# Patient Record
Sex: MALE | Race: WHITE | Employment: FULL TIME | URBAN - METROPOLITAN AREA
[De-identification: names, ages, dates, MRNs, and addresses within clinical notes are randomized per-mention and may not be internally consistent; named-entity substitution may affect disease eponyms.]

---

## 2019-01-17 PROBLEM — E78.5 HYPERLIPIDEMIA: Status: ACTIVE | Noted: 2019-01-17

## 2019-01-17 PROBLEM — J30.2 SEASONAL ALLERGIES: Status: ACTIVE | Noted: 2019-01-17

## 2019-01-17 PROBLEM — G47.33 OBSTRUCTIVE SLEEP APNEA SYNDROME: Status: ACTIVE | Noted: 2019-01-17

## 2022-09-09 ENCOUNTER — HOSPITAL ENCOUNTER (OUTPATIENT)
Dept: PREADMISSION TESTING | Age: 62
Discharge: HOME | End: 2022-09-13

## 2022-09-09 VITALS — HEIGHT: 71 IN | BODY MASS INDEX: 31.36 KG/M2 | WEIGHT: 224 LBS

## 2022-09-09 NOTE — PROGRESS NOTES

## 2022-09-22 ENCOUNTER — ANESTHESIA EVENT (OUTPATIENT)
Dept: ENDOSCOPY | Age: 62
End: 2022-09-22
Payer: COMMERCIAL

## 2022-09-23 ENCOUNTER — ANESTHESIA (OUTPATIENT)
Dept: ENDOSCOPY | Age: 62
End: 2022-09-23
Payer: COMMERCIAL

## 2022-09-23 ENCOUNTER — HOSPITAL ENCOUNTER (OUTPATIENT)
Age: 62
Setting detail: OUTPATIENT SURGERY
Discharge: HOME | End: 2022-09-23
Attending: SURGERY | Admitting: SURGERY
Payer: COMMERCIAL

## 2022-09-23 VITALS
OXYGEN SATURATION: 96 % | DIASTOLIC BLOOD PRESSURE: 76 MMHG | TEMPERATURE: 97.2 F | SYSTOLIC BLOOD PRESSURE: 151 MMHG | BODY MASS INDEX: 30.8 KG/M2 | HEART RATE: 60 BPM | WEIGHT: 220 LBS | HEIGHT: 71 IN | RESPIRATION RATE: 20 BRPM

## 2022-09-23 PROCEDURE — 7100000030 HC ASPR PHASE II RECOVERY - FIRST 15 MIN: Performed by: SURGERY

## 2022-09-23 PROCEDURE — 3609027000 HC COLONOSCOPY: Performed by: SURGERY

## 2022-09-23 PROCEDURE — 6360000002 HC RX W HCPCS: Performed by: NURSE ANESTHETIST, CERTIFIED REGISTERED

## 2022-09-23 PROCEDURE — 3700000001 HC ADD 15 MINUTES (ANESTHESIA): Performed by: SURGERY

## 2022-09-23 PROCEDURE — 7100000000 HC PACU RECOVERY - FIRST 15 MIN: Performed by: SURGERY

## 2022-09-23 PROCEDURE — 2709999900 HC NON-CHARGEABLE SUPPLY: Performed by: SURGERY

## 2022-09-23 PROCEDURE — 7100000011 HC PHASE II RECOVERY - ADDTL 15 MIN: Performed by: SURGERY

## 2022-09-23 PROCEDURE — 7100000010 HC PHASE II RECOVERY - FIRST 15 MIN: Performed by: SURGERY

## 2022-09-23 PROCEDURE — 2500000003 HC RX 250 WO HCPCS: Performed by: NURSE ANESTHETIST, CERTIFIED REGISTERED

## 2022-09-23 PROCEDURE — 7100000001 HC PACU RECOVERY - ADDTL 15 MIN: Performed by: SURGERY

## 2022-09-23 PROCEDURE — 7100000031 HC ASPR PHASE II RECOVERY - ADDTL 15 MIN: Performed by: SURGERY

## 2022-09-23 PROCEDURE — 3700000000 HC ANESTHESIA ATTENDED CARE: Performed by: SURGERY

## 2022-09-23 PROCEDURE — 2580000003 HC RX 258: Performed by: ANESTHESIOLOGY

## 2022-09-23 RX ORDER — SODIUM CHLORIDE 9 MG/ML
INJECTION, SOLUTION INTRAVENOUS PRN
Status: DISCONTINUED | OUTPATIENT
Start: 2022-09-23 | End: 2022-09-23 | Stop reason: HOSPADM

## 2022-09-23 RX ORDER — LIDOCAINE HYDROCHLORIDE 10 MG/ML
1 INJECTION, SOLUTION EPIDURAL; INFILTRATION; INTRACAUDAL; PERINEURAL
Status: DISCONTINUED | OUTPATIENT
Start: 2022-09-23 | End: 2022-09-23 | Stop reason: HOSPADM

## 2022-09-23 RX ORDER — LIDOCAINE HYDROCHLORIDE 10 MG/ML
INJECTION, SOLUTION EPIDURAL; INFILTRATION; INTRACAUDAL; PERINEURAL PRN
Status: DISCONTINUED | OUTPATIENT
Start: 2022-09-23 | End: 2022-09-23 | Stop reason: SDUPTHER

## 2022-09-23 RX ORDER — SODIUM CHLORIDE 0.9 % (FLUSH) 0.9 %
5-40 SYRINGE (ML) INJECTION PRN
Status: DISCONTINUED | OUTPATIENT
Start: 2022-09-23 | End: 2022-09-23 | Stop reason: HOSPADM

## 2022-09-23 RX ORDER — ONDANSETRON 2 MG/ML
4 INJECTION INTRAMUSCULAR; INTRAVENOUS
Status: DISCONTINUED | OUTPATIENT
Start: 2022-09-23 | End: 2022-09-23 | Stop reason: HOSPADM

## 2022-09-23 RX ORDER — FENTANYL CITRATE 50 UG/ML
25 INJECTION, SOLUTION INTRAMUSCULAR; INTRAVENOUS EVERY 5 MIN PRN
Status: DISCONTINUED | OUTPATIENT
Start: 2022-09-23 | End: 2022-09-23 | Stop reason: HOSPADM

## 2022-09-23 RX ORDER — SODIUM CHLORIDE 0.9 % (FLUSH) 0.9 %
5-40 SYRINGE (ML) INJECTION EVERY 12 HOURS SCHEDULED
Status: DISCONTINUED | OUTPATIENT
Start: 2022-09-23 | End: 2022-09-23 | Stop reason: HOSPADM

## 2022-09-23 RX ORDER — FENTANYL CITRATE 50 UG/ML
50 INJECTION, SOLUTION INTRAMUSCULAR; INTRAVENOUS EVERY 5 MIN PRN
Status: DISCONTINUED | OUTPATIENT
Start: 2022-09-23 | End: 2022-09-23 | Stop reason: HOSPADM

## 2022-09-23 RX ORDER — SODIUM CHLORIDE, SODIUM LACTATE, POTASSIUM CHLORIDE, CALCIUM CHLORIDE 600; 310; 30; 20 MG/100ML; MG/100ML; MG/100ML; MG/100ML
INJECTION, SOLUTION INTRAVENOUS CONTINUOUS
Status: DISCONTINUED | OUTPATIENT
Start: 2022-09-23 | End: 2022-09-23 | Stop reason: HOSPADM

## 2022-09-23 RX ORDER — PROPOFOL 10 MG/ML
INJECTION, EMULSION INTRAVENOUS PRN
Status: DISCONTINUED | OUTPATIENT
Start: 2022-09-23 | End: 2022-09-23 | Stop reason: SDUPTHER

## 2022-09-23 RX ORDER — DIPHENHYDRAMINE HYDROCHLORIDE 50 MG/ML
12.5 INJECTION INTRAMUSCULAR; INTRAVENOUS
Status: DISCONTINUED | OUTPATIENT
Start: 2022-09-23 | End: 2022-09-23 | Stop reason: HOSPADM

## 2022-09-23 RX ORDER — FENTANYL CITRATE 50 UG/ML
INJECTION, SOLUTION INTRAMUSCULAR; INTRAVENOUS PRN
Status: DISCONTINUED | OUTPATIENT
Start: 2022-09-23 | End: 2022-09-23 | Stop reason: SDUPTHER

## 2022-09-23 RX ADMIN — LIDOCAINE HYDROCHLORIDE 50 MG: 10 INJECTION, SOLUTION EPIDURAL; INFILTRATION; INTRACAUDAL; PERINEURAL at 10:13

## 2022-09-23 RX ADMIN — FENTANYL CITRATE 50 MCG: 50 INJECTION, SOLUTION INTRAMUSCULAR; INTRAVENOUS at 10:18

## 2022-09-23 RX ADMIN — PROPOFOL 50 MG: 10 INJECTION, EMULSION INTRAVENOUS at 10:14

## 2022-09-23 RX ADMIN — PROPOFOL 200 MG: 10 INJECTION, EMULSION INTRAVENOUS at 10:13

## 2022-09-23 RX ADMIN — PROPOFOL 50 MG: 10 INJECTION, EMULSION INTRAVENOUS at 10:15

## 2022-09-23 RX ADMIN — SODIUM CHLORIDE, POTASSIUM CHLORIDE, SODIUM LACTATE AND CALCIUM CHLORIDE: 600; 310; 30; 20 INJECTION, SOLUTION INTRAVENOUS at 09:21

## 2022-09-23 ASSESSMENT — ENCOUNTER SYMPTOMS
WHEEZING: 0
COUGH: 0
SORE THROAT: 0
TROUBLE SWALLOWING: 0
RHINORRHEA: 0
SHORTNESS OF BREATH: 0

## 2022-09-23 ASSESSMENT — PAIN SCALES - GENERAL: PAINLEVEL_OUTOF10: 0

## 2022-09-23 ASSESSMENT — PAIN - FUNCTIONAL ASSESSMENT: PAIN_FUNCTIONAL_ASSESSMENT: 0-10

## 2022-09-23 NOTE — ANESTHESIA POSTPROCEDURE EVALUATION
Department of Anesthesiology  Postprocedure Note    Patient: Jordana Luciano  MRN: [id number]  YOB: 1960  Date of evaluation: 9/23/2022      Procedure Summary     Date: 09/23/22 Room / Location: [facility] ENDO 03 / Sirisha Pham SHANDA    Anesthesia Start: 1008 Anesthesia Stop: 3347    Procedure: COLORECTAL CANCER SCREENING, NOT HIGH RISK (Rectum) Diagnosis:       Colon cancer screening      (COLON CANCER SCREENING)    Surgeons: Maren Samayoa MD Responsible Provider: Camila Cordova MD    Anesthesia Type: General ASA Status: 3          Anesthesia Type: General    Christiano Phase I: Christiano Score: 10    Christiano Phase II: Christiano Score: 10      Anesthesia Post Evaluation    Comments: POST- ANESTHESIA EVALUATION       Pt Name: Jordana Luciano  MRN: [id number]  YOB: 1960  Date of evaluation: 9/23/2022  Time:  12:42 PM      BP (!) 151/76   Pulse 60   Temp 97.2 °F (36.2 °C) (Infrared)   Resp 20   Ht 5' 11\" (1.803 m)   Wt 220 lb (99.8 kg)   SpO2 96%   BMI 30.68 kg/m²      Consciousness Level  Awake  Cardiopulmonary Status  Stable  Pain Adequately Treated YES  Nausea / Vomiting  NO  Adequate Hydration  YES  Anesthesia Related Complications NONE      Electronically signed by Camila Cordova MD on 9/23/2022 at 12:42 PM

## 2022-09-23 NOTE — OP NOTE
Operative Note      Patient: Elisabeth Portillo  YOB: 1960  MRN: [id number]    Date of Procedure: 9/23/2022  PROCEDURE NOTE    DATE OF PROCEDURE: 9/23/2022    SURGEON: Alexis Barrera MD    ASSISTANT: None    PREOPERATIVE DIAGNOSIS: Screening colonoscopy    POSTOPERATIVE DIAGNOSIS: Grade 1 internal hemorrhoid. Mild sigmoid diverticulosis. OPERATION: Total colonoscopy to cecum with intubation of terminal ileum    ANESTHESIA: General    ESTIMATED BLOOD LOSS: None    COMPLICATIONS: None     SPECIMENS:  Was Not Obtained    HISTORY: The patient is a 65y.o. year old male with history of above preop diagnosis. I recommended colonoscopy with possible biopsy or polypectomy and I explained the risk, benefits, expected outcome, and alternatives to the procedure. Risks included but are not limited to bleeding, infection, respiratory distress, hypotension, and perforation of the colon and possibility of missing a lesion. The patient understands and is in agreement. PROCEDURE: The patient was given IV conscious sedation. The patient's SPO2 remained above 90% throughout the procedure. Digital rectal exam was normal.  The colonoscope was inserted through the anus into the rectum and advanced under direct vision to the cecum without difficulty. Terminal ileum was examined for approximately 2 inches. The prep was good. Findings:  Terminal ileum: normal    Cecum/Ascending colon: normal    Transverse colon: normal    Descending/Sigmoid colon: abnormal: Sigmoid diverticulosis mild    Rectum/Anus: examined in normal and retroflexed positions and was abnormal: Grade 1 internal hemorrhoid    Withdrawal Time was (minutes): 20      Next screening colonoscopy: 10 years. The colon was decompressed. While withdrawing the scope the above findings were verified and the scope was removed. The patient tolerated the procedure and conscious sedation without unusual events.     In the recovery room patient was examined and remains hemodynamically stable. Discharge home when criteria met.     Recommendations/Plan:   Discussed with the family  High fiber diet   Precautions to avoid constipation    Electronically signed by Shorty Fields MD  on 9/23/2022 at 10:48 AM

## 2022-09-23 NOTE — DISCHARGE INSTRUCTIONS
Sedation or General Anesthesia, Adult  Care After  Refer to this sheet in the next 24 hours. These instructions provide you with information on caring for yourself after your procedure. Your caregiver may also give you more specific instructions. Your treatment has been planned according to current medical practices, but problems sometimes occur. Call your caregiver if you have any problems or questions after your procedure. HOME CARE INSTRUCTIONS   Do not participate in any activities that require you to be alert or coordinated. Do not:  Drive. Swim. Ride a bicycle. Operate heavy machinery. Rosalie Mary. Use power tools. Climb ladders. Work at [employer]. Take a bath. Do not drink alcohol. Do not make any important decisions or sign legal documents. Stay with an adult. The first meal following your procedure should be light and small. Avoid solid foods if you feel sick to your stomach (nauseous) or if you throw up (vomit). Drink enough fluids to keep your urine clear or pale yellow. Only take your usual medicines or new medicines if your caregiver approves them. Only take over-the-counter or prescription medicines for pain, discomfort, or fever as directed by your caregiver. Keep all follow-up appointments as directed by your caregiver. SEEK IMMEDIATE MEDICAL CARE IF:   You are not feeling normal or behaving normally after 24 hours. You have persistent nausea and vomiting. You are unable to drink fluids or eat food. You have difficulty urinating. You have difficulty breathing or speaking. You have blue or gray skin. There is difficulty waking or you cannot be woken up. You have heavy bleeding, redness, or a lot of swelling where the sedative or anesthesia entered your skin (intravenous site). You have a rash. MAKE SURE YOU:  Understand these instructions. Will watch your condition. Will get help right away if you are not doing well or get worse.   Document Released: 12/18/2006 Document Revised: 06/18/2013 Document Reviewed: 04/17/2013  ExitCare® Patient Information ©2013 [employer]. Colonoscopy: What to Expect at [facility]  After you have a colonoscopy, you will stay at the clinic for 1 to 2 hours until the medicines wear off. Then you can go home, but you will need to arrange for a ride. Your doctor will tell you when you can eat and do your other usual activities. Your doctor will talk to you about when you will need your next colonoscopy. The results of your test and your risk for colorectal cancer will help your doctor decide how often you need to be checked. After the test, you may be bloated or have gas pains. You may need to pass gas. If a biopsy was done or a polyp was removed, you may have streaks of blood in your stool (feces) for a few days. This care sheet gives you a general idea about how long it will take for you to recover. But each person recovers at a different pace. Follow the steps below to get better as quickly as possible. How can you care for yourself at home? Activity  Rest as much as you need to after you go home. You should be able to go back to your usual activities the day after the test.  Diet  Follow your doctors directions for eating. Drink plenty of fluids (unless your doctor has told you not to) to replace the fluids that were lost during the colon prep. Do not drink alcohol. Medicines  If polyps were removed or a biopsy was done during the test, your doctor may tell you not to take aspirin or other anti-inflammatory medicines, such as ibuprofen (Advil, Motrin) and naproxen (Aleve), for a few days. Other instructions  For your safety, you should not drive or operate machinery until the medicine effects are gone and you can think clearly. Your doctor may tell you not to drive or operate machinery until the day after your test.  Do not sign legal documents or make major decisions until the medicine effects are gone and you can think clearly.  The anesthesia medicine can make it hard for you to fully understand what you are agreeing to. Follow-up care is a key part of your treatment and safety. Be sure to make and go to all appointments, and call your doctor if you are having problems. It's also a good idea to know your test results and keep a list of the medicines you take. Call your Doctor if you have any of the following:             Passing blood rectally or vomiting blood (it may be red or black). Persistent nausea or vomiting. Severe abdominal or chest pain, not relieved by passing gas. Fever of 100 or more, chills or excessive sweating. Redness or swelling at the IV site. If you experience shortness of breath or severe chest pain, call 911. Where can you learn more? Go to https://Xpresso.Burstly. org and sign in to your Mattermark account. Enter E264 in the [employer] box to learn more about Colonoscopy: What to Expect at Home.     If you do not have an account, please click on the Sign Up Now link. © 2385-0071 Healthwise, Incorporated. Care instructions adapted under license by [facility]. This care instruction is for use with your licensed healthcare professional. If you have questions about a medical condition or this instruction, always ask your healthcare professional. [address] any warranty or liability for your use of this information. Content Version: 2.6.254404; Last Revised: February 20, 2013       PATIENT INSTRUCTIONS  DIVERTICULOSIS    FOLLOW-UP:  Please make an appointment with your physician as directed. Call your physician immediately if you have any fevers greater than 102.5,  increasing abdominal pain, GI bleeding (from the colon or rectum),or nausea/vomiting.       CAUSE:  Some people may have congenital diverticulosis, but most people develop diverticulosis around or after age 36 due to a low-fiber, high-fat diet, along with inadequate fluid intake. This is very prevalent in the industrialized world where we eat a lot of processed, low fiber foods. Diverticuli develop due to firm stool and high pressures in the colon, along with secondary spasm, which causes outpouchings to occus where the small arteries penetrate the wall of the colon to feed the internal lining. These occur most commonly on the left side and lower portions of the colon. Diverticuli can then cause bleeding from the arteries at these sites of weakness when they rupture or the diveritculi can get blocked with stool and debris and become obstructed, causing diverticulosis, which is due to an infection. When these are infected, diverticulitis, it is often treated with antibiotics and bowel rest, but when severe, recurrent, or if rupture of the colon occurs, it may require surgery. Following appropriate dietary changes and taking the proper precautions is therefore very important. DIET:  You should increase your dietary fiber intake and take a fiber supplement twice a day. Make sure that you are taking a supplement that is just fiber and is not a laxative, which should be noted on the package. Starting a fiber supplement may cause increased gas, more frequent bowel movements, and distension at first but this should improve after a couple of weeks. Try to eat whole wheat breads and pasta, more fruits and vegetables, along with brown rice and plenty of fluids. Avoid small undigestible food items that could get stuck in these outpouchings, such as unpopped popcorn kernals, whole corn, small undigestible seeds, etc..    ACTIVITY:  Exercise is also a great way to prevent constipation and is encouraged. It may also help prevent progression of your diverticulosis. Always make sure you take in plenty of fluids when exercising. MEDICATIONS:  Take an over-the-counter fiber supplement as noted above twice daily.   If your symptoms don't improve with fiber and dietary changes alone your physician may also recommend psyllium or methylcellulose as well. If your physician has placed you on an antibiotic it is critical that you take the full course of these, even if your symptoms have improved, and that you not miss any doses. QUESTIONS:  Please feel free to call your physician or the hospital  if you have any questions, and they will be glad to assist you. If you have further questions it may also be helpful to meet with a dietitician. High-Fiber Diet     What Is Fiber? Dietary fiber is a form of carbohydrate found in plants that cannot be digested by humans. All plants contain fiber, including fruits, vegetables, grains, and legumes. Fiber is often classified into two categories: soluble and insoluble. Soluble fiber draws water into the bowel and can help slow digestion. Examples of foods that are high in soluble fiber include oatmeal, oat bran, barley, legumes (eg, beans and peas), apples, and strawberries. Insoluble fiber speeds digestion and can add bulk to the stool. Examples of foods that are high in insoluble fiber include whole-wheat products, wheat bran, cauliflower, green beans, and potatoes. Why Follow a High-Fiber Diet? A high-fiber diet is often recommended to prevent and treat constipation , hemorrhoids , diverticulitis , and irritable bowel syndrome . Eating a high-fiber diet can also help improve your cholesterol levels, lower your risk of coronary heart disease , reduce your risk of type 2 diabetes , and lower your weight. For people with type 1 or 2 diabetes, a high-fiber diet can also help stabilize blood sugar levels. How Much Fiber Should I Eat? A high-fiber diet should contain  20-35 grams  of fiber a day. This is actually the amount recommended for the general adult population; however, most Americans eat only 15 grams of fiber per day.    Digestion of Fiber   Eating a higher fiber diet than usual can take some getting used to by your body's digestive system. To avoid the side effects of sudden increases in dietary fiber (eg, gas, cramping, bloating, and diarrhea), increase fiber gradually and be sure to drink plenty of fluids every day. Tips for Increasing Fiber Intake   Whenever possible, choose whole grains over refined grains (eg, brown rice instead of white rice, whole-wheat bread instead of white bread). Include a variety of grains in your diet, such as wheat, rye, barley, oats, quinoa, and bulgur. Eat more vegetarian-based meals. Here are some ideas: black bean burgers, eggplant lasagna, and veggie tofu stir-alamo. Choose high-fiber snacks, such as fruits, popcorn, whole-grain crackers, and nuts. Make whole-grain cereal or whole-grain toast part of your daily breakfast regime. When eating out, whether ordering a sandwich or dinner, ask for extra vegetables. When baking, replace part of the white flour with whole-wheat flour. Whole-wheat flour is particularly easy to incorporate into a recipe. High-Fiber Diet Eating Guide   Food Category   Foods Recommended   Notes   Grains   Whole-grain breads, muffins, bagels, or argelia bread Rye bread Whole-wheat crackers or crisp breads Whole-grain or bran cereals Oatmeal, oat bran, or grits Wheat germ Whole-wheat pasta and brown rice   Read the ingredients list on food labels. Look for products that list \"whole\" as the first ingredient (eg, whole-wheat, whole oats). Choose cereals with at least 2 grams of fiber per serving.    Vegetables   All vegetables, especially asparagus, bean sprouts, broccoli, Partridge sprouts, cabbage, carrots, cauliflower, celery, corn, greens, green beans, green pepper, onions, peas, potatoes (with skin), snow peas, spinach, squash, sweet potatoes, tomatoes, zucchini   For maximum fiber intake, eat the peels of fruits and vegetablesjust be sure to wash them well first.   Fruits   All fruits, especially apples, berries, grapefruits, mangoes, nectarines, oranges, peaches, pears, dried fruits (figs, dates, prunes, raisins)   Choose raw fruits and vegetables over juice, cooked, or cannedraw fruit has more fiber. Dried fruit is also a good source of fiber. Milk   With the exception of yogurt containing inulin (a type of fiber), dairy foods provide little fiber. Add more fiber by topping your yogurt or cottage cheese with fresh fruit, whole grain or bran cereals, nuts, or seeds. Meats and Beans   All beans and peas, especially Garbanzo beans, kidney beans, lentils, lima beans, split peas, and fuchs beans All nuts and seeds, especially almonds, peanuts, [place] nuts, cashews, peanut butter, walnuts, sesame and sunflower seeds All meat, poultry, fish, and eggs   Increase fiber in meat dishes by adding fuchs beans, kidney beans, black-eyed peas, bran, or oatmeal. If you are following a low-fat diet, use nuts and seeds only in moderation. Fats and Oils   All in moderation   Fats and oils do not provide fiber   Snacks, Sweets, and Condiments   Fruit Nuts Popcorn, whole-wheat pretzels, or trail mix made with dried fruits, nuts, and seeds Cakes, breads, and cookies made with oatmeal or whole-wheat flour   Most snack foods do not provide much fiber. Choose snacks with at least 2 grams of fiber per serving. Last Reviewed: March 2011 Kelly Gotti MS, MPH, RD   Updated: 3/29/2011       Hemorrhoids: Care Instructions  Overview     Hemorrhoids are swollen veins that develop in the anal canal. Bleeding during bowel movements, itching, and rectal pain are the most common symptoms. Hemorrhoids can be uncomfortable at times, but rarely are they a serious problem. Most of the time, you can treat them with simple changes to your diet and bowel habits. These changes include eating more fiber and not straining to pass stools. Most hemorrhoids don't need surgery or other treatment unless they are very large and painful or bleed a lot.   Follow-up care is a key part of your treatment and safety. Be sure to make and go to all appointments, and call your doctor if you are having problems. It's also a good idea to know your test results and keep a list of the medicines you take. How can you care for yourself at home? Sit in a few inches of warm water (sitz bath) 3 times a day and after bowel movements. The warm water helps with pain and itching. Put ice on your anal area several times a day for 10 minutes at a time. Put a thin cloth between the ice and your skin. Follow this by placing a warm, wet towel on the area for another 10 to 20 minutes. Take pain medicines exactly as directed. If the doctor gave you a prescription medicine for pain, take it as prescribed. If you are not taking a prescription pain medicine, ask your doctor if you can take an over-the-counter medicine. Keep the anal area clean, but be gentle. Use water and a fragrance-free soap, or use baby wipes or medicated pads such as Tucks. Wear cotton underwear and loose clothing to decrease moisture in the anal area. Eat more fiber. Include foods such as whole-grain breads and cereals, raw vegetables, raw and dried fruits, and beans. Drink plenty of fluids. If you have kidney, heart, or liver disease and have to limit fluids, talk with your doctor before you increase the amount of fluids you drink. Use a stool softener that contains bran or psyllium. You can save money by buying bran or psyllium (available in bulk at most health food stores) and sprinkling it on foods or stirring it into fruit juice. Or you can use a product such as Metamucil or Hydrocil. Practice healthy bowel habits. Go to the bathroom as soon as you have the urge. Avoid straining to pass stools. Relax and give yourself time to let things happen naturally. Do not hold your breath while passing stools. Do not read while sitting on the toilet. Get off the toilet as soon as you have finished. Take your medicines exactly as prescribed. Call your doctor if you think you are having a problem with your medicine. When should you call for help? Call 911 anytime you think you may need emergency care. For example, call if:    You pass maroon or very bloody stools. Call your doctor now or seek immediate medical care if:    You have increased pain. You have increased bleeding. Watch closely for changes in your health, and be sure to contact your doctor if:    Your symptoms have not improved after 3 or 4 days. Where can you learn more? Go to https://V[employer] GrouppeLexpertia.com.Octoplus. org and sign in to your Siftit account. Enter A727 in the [employer] box to learn more about \"Hemorrhoids: Care Instructions. \"     If you do not have an account, please click on the \"Sign Up Now\" link. Current as of: June 6, 2022               Content Version: 13.4  © 6865-1084 [employer]. Care instructions adapted under license by [facility] ([facility]). If you have questions about a medical condition or this instruction, always ask your healthcare professional. [address] any warranty or liability for your use of this information. DISCHARGE INSTRUCTIONS FOR COLONOSCOPY    In order to continue your care at home, please follow the instructions below. For General Anesthesia:  Do not drink any alcoholic beverages or make any legal or important decisions for 24 hours. Do not drive or operate machinery for 24 hours. You may return to work after 24 hours. Diet    Drink plenty of fluids after surgery, unless you are on a fluid restriction. After general anesthesia, start out eating lightly (broth, soup, bread, etc.) advancing as tolerated to your usual diet. Try to avoid spicy or greasy/fatty foods for 24 hours. Avoid milk/milk product for several hours. Medications  Take medications as ordered by your surgeon. Activities  Limit your activities for 24 hours. Walk around to help pass gas.   You may shower. Call your surgeon for the following: If you have abdominal pain that is not relieved by passing gas. For an oral temperature (by mouth) is 101 degrees or higher, chills or excessive sweating. You have increasing and progressive bleeding or drainage from surgery area. Persistent nausea or vomiting  Rectal bleeding (may be red, maroon, or black) or change in your bowel habits. Redness or swelling at the IV site. If you are unable to urinate within 8 hours of surgery. For any questions or concerns you may have.

## 2022-09-23 NOTE — ANESTHESIA PRE PROCEDURE
Department of Anesthesiology  Preprocedure Note       Name:  Evangelina Duke   Age:  65 y.o.  :  1960                                          MRN:  [id number]         Date:  2022      Surgeon: Chiquis Signs):  Fabiana Kowalski MD    Procedure: Procedure(s):  COLORECTAL CANCER SCREENING, NOT HIGH RISK    Medications prior to admission:   Prior to Admission medications    Medication Sig Start Date End Date Taking? Authorizing Provider   Fluticasone Furoate-Vilanterol (BREO ELLIPTA IN) Inhale 2 puffs into the lungs daily    Historical Provider, MD       Current medications:    Current Facility-Administered Medications   Medication Dose Route Frequency Provider Last Rate Last Admin    sodium chloride flush 0.9 % injection 5-40 mL  5-40 mL IntraVENous 2 times per day Monse Sullivan MD        sodium chloride flush 0.9 % injection 5-40 mL  5-40 mL IntraVENous PRN Monse Sullivan MD        0.9 % sodium chloride infusion   IntraVENous PRN Monse Sullivan MD        lactated ringers infusion   IntraVENous Continuous Monse Sullivan  mL/hr at 22 0921 New Bag at 22 0921    lidocaine PF 1 % injection 1 mL  1 mL IntraDERmal Once PRN Monse Sullivan MD           Allergies:     Allergies   Allergen Reactions    No Known Allergies        Problem List:    Patient Active Problem List   Diagnosis Code    Obstructive sleep apnea syndrome G47.33    Hyperlipidemia E78.5    Asthma J45.909    Seasonal allergies J30.2       Past Medical History:        Diagnosis Date    Asthma     Kidney stones     ONDINA (obstructive sleep apnea)     Uses machine       Past Surgical History:        Procedure Laterality Date    COLONOSCOPY      Age 49   [de-identified] CUFF REPAIR Right        Social History:    Social History     Tobacco Use    Smoking status: Never    Smokeless tobacco: Never   Substance Use Topics    Alcohol use: Yes     Comment: Social (not weekly)                                Counseling given: Not Answered      Vital Negative Neuro/Psych ROS              GI/Hepatic/Renal: Neg GI/Hepatic/Renal ROS            Endo/Other: Negative Endo/Other ROS                    Abdominal:             Vascular: negative vascular ROS. Other Findings:           Anesthesia Plan      general     ASA 3       Induction: intravenous. MIPS: Postoperative opioids intended and Prophylactic antiemetics administered. Anesthetic plan and risks discussed with patient. Plan discussed with CRNA.                     Shellie Cummins MD   9/23/2022

## 2022-09-23 NOTE — H&P
HISTORY and [id number]       NAME:  Rita Heredia  MRN: [id number]   YOB: 1960   Date: 9/23/2022   Age: 65 y.o. Gender: male     COMPLAINT AND PRESENT HISTORY:   iRta Heredia is 65 y.o.,  male, undergoing COLORECTAL CANCER SCREENING, NOT HIGH RISK for COLON CANCER SCREENING. COLONOSCOPY QUESTIONNAIRE  LAST COLONOSCOPY: x1 colonoscopy done at age 49 in the past.  HISTORY OF COLON POLYPS: Denies. ABD PAIN: Denies. CONSTIPATION: Denies. DIARRHEA (ASIDE FROM COLONOSCOPY PREP): Denies. BLOOD IN STOOL/BLACK, TARRY STOOLS: Denies. NAUSEA/VOMITING/HEMATEMESIS: Denies. FEVER/CHILLS: Denies. APPETITE CHANGE: Denies. RECENT UNINTENDED WEIGHT LOSS: Denies. DIFFICULTY SWALLOWING/PHARYNGITIS/VOICE CHANGE: Denies. Review of additional significant medical hx (see chart for additional detail, including current medications / see ROS for current s/s): ASTHMA (states well controlled), ONDINA (uses machine consistently)- he does f/u with pulmonology. NPO status: Patient states they have been NPO since before midnight. Medications taken TODAY (with sip of water): None, no inhaler use. Anticoagulation status: Patient denies taking any anti-coagulants, including aspirin currently. Patient reports completing bowel prep without complications, last BM reported this morning- they state last BM was mostly clear in consistency (water like). Pertinent family hx: Denies family hx of esophageal and colon CA. Denies personal hx of blood clots. Denies personal hx of MRSA infection. Denies any personal or family hx of previous complications w/anesthesia.   PAST MEDICAL HISTORY     Past Medical History:   Diagnosis Date    Asthma     Kidney stones     ONDINA (obstructive sleep apnea)     Uses machine       SURGICAL HISTORY       Past Surgical History:   Procedure Laterality Date    COLONOSCOPY      Age 48    ROTATOR CUFF REPAIR Right        SOCIAL HISTORY       Social History Socioeconomic History    Marital status:      Spouse name: None    Number of children: None    Years of education: None    Highest education level: None   Tobacco Use    Smoking status: Never    Smokeless tobacco: Never   Substance and Sexual Activity    Alcohol use: Yes     Comment: Social (not weekly)    Drug use: Never       REVIEW OF SYSTEMS      Allergies   Allergen Reactions    No Known Allergies        No current facility-administered medications on file prior to encounter. No current outpatient medications on file prior to encounter. (Notation: Medications listed above are not currently reconciled at the signing of this H&P note, to be reconciled in pre-op per RN)    Review of Systems   Constitutional:  Negative for chills and fever. HENT:  Negative for congestion, ear pain, rhinorrhea, sore throat and trouble swallowing. Respiratory:  Negative for cough, shortness of breath and wheezing. Cardiovascular:  Negative for chest pain, palpitations and leg swelling. Gastrointestinal:         See HPI. Genitourinary:  Negative for dysuria and frequency. Neurological:  Negative for dizziness and headaches. Hematological:  Does not bruise/bleed easily. GENERAL PHYSICAL EXAM     Vitals: Review current vital signs per RN flow sheet. GENERAL APPEARANCE:   Alistair Howard is 65 y.o.,  male, mildly obese, nourished, conscious, alert. Does not appear to be in any distress or pain at this time. Physical Exam  Constitutional:       General: He is not in acute distress. Appearance: He is well-developed. He is not ill-appearing, toxic-appearing or diaphoretic. HENT:      Head: Normocephalic. Right Ear: External ear normal.      Left Ear: External ear normal.      Nose: Nose normal.      Mouth/Throat:      Dentition: Abnormal dentition (x1 missing tooth, bottom right). Pharynx: No oropharyngeal exudate or posterior oropharyngeal erythema. Tonsils: No tonsillar abscesses. Eyes:      General:         Right eye: No discharge. Left eye: No discharge. Cardiovascular:      Rate and Rhythm: Normal rate and regular rhythm. Pulses: Intact distal pulses. Heart sounds: Normal heart sounds. Pulmonary:      Effort: Pulmonary effort is normal. No accessory muscle usage or respiratory distress. Breath sounds: Wheezing (Faint expiratory wheeze noted to anterior RUL, completely cleared w/cough) present. No decreased breath sounds, rhonchi or rales. Abdominal:      General: Bowel sounds are normal. There is no distension. Palpations: Abdomen is soft. There is no mass. Tenderness: There is no abdominal tenderness. There is no guarding or rebound. Musculoskeletal:      Right lower leg: No swelling or tenderness. Left lower leg: No swelling or tenderness. Comments: Negative Nacho's sign b/l. Skin:     General: Skin is warm and dry. Neurological:      Mental Status: He is alert and oriented to person, place, and time.    Psychiatric:         Behavior: Behavior normal.       RECENT LAB WORK: No recent lab work noted   No results found for: NA, K, CL, CO2, BUN, CREATININE, GLUCOSE, CALCIUM, PROT, LABALBU, BILITOT, ALKPHOS, AST, ALT, LABGLOM, GFRAA, AGRATIO, GLOB  No results found for: WBC, HGB, HCT, MCV, PLT  PROVISIONAL DIAGNOSES / SURGERY:      COLON CANCER SCREENING    COLORECTAL CANCER SCREENING, NOT HIGH RISK    Patient Active Problem List    Diagnosis Date Noted    Obstructive sleep apnea syndrome 01/17/2019    Hyperlipidemia 01/17/2019    Seasonal allergies 01/17/2019    Asthma 04/29/2008           LELO Cavazos - CNP on 9/23/2022 at 8:54 AM

## (undated) DEVICE — ENDO KIT W/SYRINGE: Brand: MEDLINE INDUSTRIES, INC.

## (undated) DEVICE — GLOVE ORANGE PI 7 1/2   MSG9075

## (undated) DEVICE — DEFENDO AIR WATER SUCTION AND BIOPSY VALVE KIT FOR  OLYMPUS: Brand: DEFENDO AIR/WATER/SUCTION AND BIOPSY VALVE